# Patient Record
Sex: MALE | Race: BLACK OR AFRICAN AMERICAN | Employment: FULL TIME | URBAN - METROPOLITAN AREA
[De-identification: names, ages, dates, MRNs, and addresses within clinical notes are randomized per-mention and may not be internally consistent; named-entity substitution may affect disease eponyms.]

---

## 2018-06-27 ENCOUNTER — HOSPITAL ENCOUNTER (OUTPATIENT)
Dept: PHYSICAL THERAPY | Age: 68
Discharge: HOME | End: 2018-06-27
Payer: MEDICARE

## 2018-06-27 PROCEDURE — G8979 MOBILITY GOAL STATUS: HCPCS

## 2018-06-27 PROCEDURE — 97110 THERAPEUTIC EXERCISES: CPT

## 2018-06-27 PROCEDURE — G8978 MOBILITY CURRENT STATUS: HCPCS

## 2018-06-27 PROCEDURE — 97162 PT EVAL MOD COMPLEX 30 MIN: CPT

## 2018-06-27 NOTE — PROGRESS NOTES
[facility] at [address] [place] [address]  Phone: [phone]   Fax: [phone]    Plan of Care/ Statement of Necessity for Physical Therapy Services    Patient name: Xavier Hess Start of Care: 2018   Referral source: Leif Urias MD : 1950    Medical Diagnosis: Low back pain [M54.5]   Onset Date:chronic    Treatment Diagnosis: back pain   Prior Hospitalization: see medical history Provider#: [id number]   Medications: Verified on Patient summary List    Comorbidities: DM, HTN   Prior Level of Function: chronic back pain with ADL's      The Plan of Care and following information is based on the information from the initial evaluation. Assessment/ key information: Pt is a 81 yo male presenting to clinic with c/o chronic back pain that has progressively worsened over the years. Pain is constant in nature and wakes pt up at night. On exam, lumbar AROM limited all planes by at least 50% except left rotation. Pt has decreased hip mobility B, significant HS tightness left > right, and decreased activity/positoin tolerance. Innominates currently aligned and stable. Pt would benefit from skilled PT intervention to address the findings.     Evaluation Complexity History MEDIUM  Complexity : 1-2 comorbidities / personal factors will impact the outcome/ POC ; Examination MEDIUM Complexity : 3 Standardized tests and measures addressing body structure, function, activity limitation and / or participation in recreation  ;Presentation MEDIUM Complexity : Evolving with changing characteristics  ; Clinical Decision Making MEDIUM Complexity : FOTO score of 26-74  Overall Complexity Rating: MEDIUM  Problem List: pain affecting function, decrease ROM, decrease strength, decrease ADL/ functional abilitiies, decrease activity tolerance and decrease flexibility/ joint mobility   Treatment Plan may include any combination of the following: Therapeutic exercise, Therapeutic activities, Neuromuscular re-education, Physical agent/modality, Manual therapy, Aquatic therapy and Patient education  Patient / Family readiness to learn indicated by: asking questions, trying to perform skills and interest  Persons(s) to be included in education: patient (P)  Barriers to Learning/Limitations: None  Patient Goal (s): pain relief  Patient Self Reported Health Status: good  Rehabilitation Potential: good    Short Term Goals: To be accomplished in 2 weeks:  1. Patient will tolerate a full aquatics session without increased pain or difficulty. Status at eval: N/A  2. Pt will report >/= 25% improvement in symptoms to increase activity/position tolerance. Status at eval: 0%    Long Term Goals: To be accomplished in 4 weeks:  1. Improve FOTO score to >/= 56/100 to indicate decreased pain with ADL's. Status at eval: 44/100  2. Pt will report >/= 50% improvement in symptoms to increase activity/position tolerance. Status at eval: 0%  3. Pt will be independent with aquatics program to transition to pool on own at time of discharge. Status at eval: not independent  4. Increase B HS flexibility by 10 degrees to normalize function. Status at eval: 20 degrees right, 30 degrees left    Frequency / Duration: Patient to be seen 2 times per week for 4 weeks. Patient/ Caregiver education and instruction: Diagnosis, prognosis, exercises   [x]  Plan of care has been reviewed with TERRELL    G-Codes (GP)  Mobility   Current  CK= 40-59%   Goal  CK= 40-59%    The severity rating is based on clinical judgment and the FOTO score. Certification Period: 6/27/18 - 8/24/18    Estela Patterson, PT 6/27/2018 7:50 AM  _____________________________________________________________________  I certify that the above Therapy Services are being furnished while the patient is under my care. I agree with the treatment plan and certify that this therapy is necessary.     Physician's Signature:____________________ Date:__________Time:______    Please sign and return to   [facility] at [address] [place] [address]  Phone: [phone]   Fax: [phone]

## 2018-06-27 NOTE — PROGRESS NOTES
PT DAILY TREATMENT NOTE - [facility]     Patient Name: Carol Soriano  Date:2018  : 1950  [x]  Patient  Verified  Payor:  / Plan: [facility]  RETIREES AND DEPENDENTS / Product Type: Johnita Stack /    In time:800  Out time:830  Total Treatment Time (min): 30  Total Timed Codes (min): 8  1:1 Treatment Time ( only): 30   Visit #: 1 of 8    Treatment Area: Low back pain [M54.5]    SUBJECTIVE  Pain Level (0-10 scale): 6-7/10 seated at rest  Any medication changes, allergies to medications, adverse drug reactions, diagnosis change, or new procedure performed?: [x] No    [] Yes (see summary sheet for update)  Subjective functional status/changes:   [] No changes reported  See POC    OBJECTIVE        Min Type Additional Details    [] Estim:  []Unatt       []IFC  []Premod                        []Other:  []w/ice   []w/heat  Position:  Location:    [] Estim: []Att    []TENS instruct  []NMES                    []Other:  []w/US   []w/ice   []w/heat  Position:  Location:    []  Traction: [] Cervical       []Lumbar                       [] Prone          []Supine                       []Intermittent   []Continuous Lbs:  [] before manual  [] after manual    []  Ultrasound: []Continuous   [] Pulsed                           []1MHz   []3MHz W/cm2:  Location:    []  Iontophoresis with dexamethasone         Location: [] Take home patch   [] In clinic    []  Ice     []  heat  []  Ice massage  []  Laser   []  Anodyne Position:  Location:    []  Laser with stim  []  Other:  Position:  Location:    []  Vasopneumatic Device Pressure:       [] lo [] med [] hi   Temperature: [] lo [] med [] hi   [] Skin assessment post-treatment:  []intact []redness- no adverse reaction    []redness  adverse reaction:     22 min [x]Eval                  []Re-Eval       8 min Therapeutic Exercise:  [] See flow sheet : issued and reviewed initial HEP   Rationale: increase ROM to improve the patients ability to normalize ADL's     min Therapeutic Activity:  []  See flow sheet :         min Neuromuscular Re-education:  []  See flow sheet :        min Manual Therapy:          min Gait Training:  ___ feet with ___ device on level surfaces with ___ level of assist   Rationale: With   [] TE   [] TA   [] neuro   [] other: Patient Education: [x] Review HEP    [] Progressed/Changed HEP based on:   [] positioning   [] body mechanics   [] transfers   [] heat/ice application    [] other:      Other Objective/Functional Measures:   Physical Therapy Evaluation - [facility] ([facility])    SUBJECTIVE  Chief Complaint: Pt c/o chronic back pain that has progressively worsened over the years. Pain is constant in nature and wakes pt up at night. Mechanism of injury:    Symptoms:  Pain rating (0-10): Today:    Best:    Worst:    [x] Contstant:    [] Intermittent:     Aggravated by:   [] Bending [] Sitting [] Standing [] Walking   [] Moving [] Cough [] Sneeze [] Valsalva   [] AM  [] PM  Lying:  [] sup   [] pro   [] sidelying   [] Other:     Eased by:    [] Bending [] Sitting [] Standing [] Walking   [] Moving [] AM  [] PM  Lying: [] sup  [] pro  [] sidelying   [] Other:     General Health:  Red Flags Indicated? [] Yes    [] No  [] Yes [] No Recent weight change (If yes, due to dieting?  [] Yes  [] No)   [] Yes [] No Weakness in legs during walking  [] Yes [] No Unremitting pain at night  [] Yes [] No Abdominal pain or problems  [] Yes [] No Rectal bleeding  [] Yes [] No Feet more cold or painful in cold weather  [] Yes [] No Menstrual irregularities  [] Yes [] No Blood or pain with urination  [] Yes [] No Dysfunction of bowel or bladder  [] Yes [] No Recent illness within past 3 weeks (i.e, cold, flu)  [] Yes [] No Numbness/tingling in buttock/genitalia region    Past History/Treatments:     Diagnostic Tests: [] Lab work [x] X-rays    [] CT [x] MRI     [] Other:  Results: DDD    Functional Status  Prior level of function: chronic back pain with ADL's  Present functional limitations: prolonged standing and walking  What position do you sleep in?: toss and turn    OBJECTIVE  Posture:  Lateral Shift: [] R    [] L     [] +  [] -  Kyphosis: [] Increased [] Decreased   []  WNL  Lordosis:  [] Increased [] Decreased   [] WNL  Pelvic symmetry: [] WNL    [] Other:    Gait:  [] Normal     [] Abnormal:    Active Movements: [] N/A   [] Too acute   [] Other:  ROM % AROM % PROM Comments:pain, area   Forward flexion 40-60 Limited 50%     Extension 20-30 Limited 75%     SB right 20-30 Limited 50%     SB left 20-30 Limited 50%     Rotation right 5-10 Limited 50%     Rotation left 5-10 Limited 25%       Repeated Movements   Effects on present pain: produces (TN), abolishes (A), increases (incr), decreases (decr), centralizes (C), peripheral (PH), no effect (NE)   Pre-Test Sx Flexion Repeated Flexion Extension Repeated Extension Repeated SBL Repeated SBR   Sitting          Standing          Lying      N/A N/A   Comments:  Side Glide:  Sustained passive positioning test:    Neuro Screen [] WNL  Myotome/Dermatome/Reflexes:  Comments:    Dural Mobility:  SLR Sitting: [] R    [] L    [] +    [] -  @ (degrees):           Supine: [] R    [] L    [] +    [] -  @ (degrees):   Slump Test: [] R    [] L    [] +    [] -  @ (degrees):   Prone Knee Bend: [] R    [] L    [] +    [] -     Palpation  [] Min  [] Mod  [] Severe    Location:  [] Min  [] Mod  [] Severe    Location:  [] Min  [] Mod  [] Severe    Location:    Stabilization Tests  Multifidus Test  Level 1: Prone abdominal draw in (Goal 6-10mmHG):  Level 2: Supported leg load supine (needle deflection at 40mmHG): [] Yes  [] No   Level 3: Unsupported leg load supine (needle deflection at 40mmHG): [] Yes  [] No     Strength   L(0-5) R (0-5) N/T   Hip Flexion (L1,2)   []   Knee Extension (L3,4)   []   Ankle Dorsiflexion (L4)   []   Great Toe Extension (L5)   []   Ankle Plantarflexion (S1)   []   Knee Flexion (S1,2)   []   Upper Abdominals   []   Lower Abdominals   []   Paraspinals   []   Back Rotators   []   Gluteus Seth   []   Other   []     Special Tests  Lumbar:  Lumb. Compression: [] Pos  [] Neg               Lumbar Distraction:   [] Pos  [] Neg    Quadrant:  [] Pos  [] Neg   [] Flex  [] Ext    Sacroilliac:  Gaenslen's: [] R    [] L    [] +    [] -     Compression: [] +    [] -     Gapping:  [] +    [] -     Thigh Thrust: [] R    [] L    [] +    [] -     Leg Length: [] +    [] -   Position:    Crests:    ASIS:    PSIS:    Sacral Sulcus:    Mobility: Standing flex:     Sitting flex:     Supine to sit:     Prone knee bend:         Hip: Sudie Macadam:  [] R    [] L    [] +    [] -     Scour:  [] R    [] L    [] +    [] -     Piriformis: [] R    [] L    [] +    [] -          Deficits: Mari's: [] R    [] L    [] +    [] -     Emir: [] R    [] L    [] +    [] -     Hamstrings 90/90: right: 20 degrees, left: 30    Gastrocsoleus (to neutral): Right: Left:       Global Muscular Weakness:  Abdominals:  Quadratus Lumborum:  Paraspinals: Other:    Other tests/comments:  Long sit test: ( - )  Limited hip mobility B       Pain Level (0-10 scale) post treatment: 6-7/10    ASSESSMENT/Changes in Function: see POC    Patient will continue to benefit from skilled PT services to modify and progress therapeutic interventions, address ROM deficits, address strength deficits, analyze and address soft tissue restrictions, analyze and cue movement patterns, analyze and modify body mechanics/ergonomics and assess and modify postural abnormalities to attain remaining goals.      [x]  See Plan of Care  []  See progress note/recertification  []  See Discharge Summary         Progress towards goals / Updated goals:  See POC    PLAN  [x]  Upgrade activities as tolerated     [x]  Continue plan of care  []  Update interventions per flow sheet       []  Discharge due to:_  [x]  Other 1x/wk on land, 1x/wk on aquatics: ROM/stretching, core stability, manual techniques, mod akilah Gamble, MARIE 6/27/2018  7:49 AM    Future Appointments  Date Time Provider Khoa Che   6/27/2018 8:00 AM Kayden Gamble, PT [email or web address]

## 2018-06-29 ENCOUNTER — APPOINTMENT (OUTPATIENT)
Dept: PHYSICAL THERAPY | Age: 68
End: 2018-06-29
Payer: MEDICARE

## 2018-07-03 ENCOUNTER — HOSPITAL ENCOUNTER (OUTPATIENT)
Dept: PHYSICAL THERAPY | Age: 68
Discharge: HOME | End: 2018-07-03
Payer: MEDICARE

## 2018-07-03 PROCEDURE — 97110 THERAPEUTIC EXERCISES: CPT

## 2018-07-03 NOTE — PROGRESS NOTES
PT DAILY TREATMENT NOTE - [facility]     Patient Name: Bert Colmenares  Date:7/3/2018  : 1950  [x]  Patient  Verified  [place]or[place] [employer] / Plan: [place] / Product Type: Medicare /    In time:102  Out time:143  Total Treatment Time (min): 41  Total Timed Codes (min): 31  1:1 Treatment Time ( only): 23   Visit #: 2 of 8    Treatment Area: Low back pain [M54.5]    SUBJECTIVE  Pain Level (0-10 scale): 6-7/10  Any medication changes, allergies to medications, adverse drug reactions, diagnosis change, or new procedure performed?: [x] No    [] Yes (see summary sheet for update)  Subjective functional status/changes:   [x] No changes reported      OBJECTIVE    Modality rationale: decrease pain and increase tissue extensibility to improve the patients ability to increase activity/position tolerance   Min Type Additional Details    [] Estim:  []Unatt       []IFC  []Premod                        []Other:  []w/ice   []w/heat  Position:  Location:    [] Estim: []Att    []TENS instruct  []NMES                    []Other:  []w/US   []w/ice   []w/heat  Position:  Location:    []  Traction: [] Cervical       []Lumbar                       [] Prone          []Supine                       []Intermittent   []Continuous Lbs:  [] before manual  [] after manual    []  Ultrasound: []Continuous   [] Pulsed                           []1MHz   []3MHz W/cm2:  Location:    []  Iontophoresis with dexamethasone         Location: [] Take home patch   [] In clinic   10 []  Ice     [x]  heat  []  Ice massage  []  Laser   []  Anodyne Position:H/L supine  Location:back    []  Laser with stim  []  Other:  Position:  Location:    []  Vasopneumatic Device Pressure:       [] lo [] med [] hi   Temperature: [] lo [] med [] hi   [] Skin assessment post-treatment:  []intact []redness- no adverse reaction    []redness  adverse reaction:      min []Eval                  []Re-Eval       31 min Therapeutic Exercise:  [x] See flow sheet :   Rationale: increase ROM and increase strength to improve the patients ability to normalize ADL's     min Therapeutic Activity:  []  See flow sheet :         min Neuromuscular Re-education:  []  See flow sheet :        min Manual Therapy:          min Gait Training:  ___ feet with ___ device on level surfaces with ___ level of assist   Rationale: With   [] TE   [] TA   [] neuro   [] other: Patient Education: [x] Review HEP    [] Progressed/Changed HEP based on:   [] positioning   [] body mechanics   [] transfers   [] heat/ice application    [] other:      Other Objective/Functional Measures: none taken today     Pain Level (0-10 scale) post treatment: 6-7/10    ASSESSMENT/Changes in Function: 1st session since eval: no new progress. Patient will continue to benefit from skilled PT services to modify and progress therapeutic interventions, address ROM deficits, address strength deficits, analyze and address soft tissue restrictions, analyze and cue movement patterns, analyze and modify body mechanics/ergonomics and assess and modify postural abnormalities to attain remaining goals. []  See Plan of Care  []  See progress note/recertification  []  See Discharge Summary         Progress towards goals / Updated goals:  Short Term Goals: To be accomplished in 2 weeks:  1. Patient will tolerate a full aquatics session without increased pain or difficulty. Status at eval: N/A  2. Pt will report >/= 25% improvement in symptoms to increase activity/position tolerance. Status at eval: 0%     Long Term Goals: To be accomplished in 4 weeks:  1. Improve FOTO score to >/= 56/100 to indicate decreased pain with ADL's. Status at eval: 44/100  2. Pt will report >/= 50% improvement in symptoms to increase activity/position tolerance. Status at eval: 0%  3. Pt will be independent with aquatics program to transition to pool on own at time of discharge. Status at eval: not independent  4.  Increase B HS flexibility by 10 degrees to normalize function.   Status at eval: 20 degrees right, 30 degrees left    PLAN  [x]  Upgrade activities as tolerated     [x]  Continue plan of care  []  Update interventions per flow sheet       []  Discharge due to:_  []  Other:_      Brittany Bernabe PT 7/3/2018  12:53 PM    Future Appointments  Date Time Provider Khoa Che   7/3/2018 1:00 PM Alec Ortiz [facility]   7/6/2018 11:30 AM MARIE Ortiz [facility]   7/10/2018 1:00 PM MARIE Ortiz [facility]   7/13/2018 12:30 PM MARIE Ortiz [facility]   7/24/2018 1:00 PM MARIE Ortiz [facility]

## 2018-07-06 ENCOUNTER — HOSPITAL ENCOUNTER (OUTPATIENT)
Dept: PHYSICAL THERAPY | Age: 68
Discharge: HOME | End: 2018-07-06
Payer: MEDICARE

## 2018-07-06 PROCEDURE — 97113 AQUATIC THERAPY/EXERCISES: CPT

## 2018-07-10 ENCOUNTER — HOSPITAL ENCOUNTER (OUTPATIENT)
Dept: PHYSICAL THERAPY | Age: 68
Discharge: HOME | End: 2018-07-10
Payer: MEDICARE

## 2018-07-10 PROCEDURE — 97110 THERAPEUTIC EXERCISES: CPT

## 2018-07-10 NOTE — PROGRESS NOTES
PT DAILY TREATMENT NOTE - [facility]     Patient Name: Donny Magallanes  Date:7/10/2018  : 1950  [x]  Patient  Verified  Payor: [place] [employer] / Plan: [place] MEDICARE PART A & B / Product Type: Medicare /    In time:1247  Out time:135  Total Treatment Time (min): 48  Total Timed Codes (min): 38  1:1 Treatment Time ( [address] only): 45   Visit #: 4 of 8    Treatment Area: Low back pain [M54.5]    SUBJECTIVE  Pain Level (0-10 scale): 6-7/10  Any medication changes, allergies to medications, adverse drug reactions, diagnosis change, or new procedure performed?: [x] No    [] Yes (see summary sheet for update)  Subjective functional status/changes:   [] No changes reported  It's the same.     OBJECTIVE    Modality rationale: decrease pain and increase tissue extensibility to improve the patients ability to increase activity/position tolerance   Min Type Additional Details    [] Estim:  []Unatt       []IFC  []Premod                        []Other:  []w/ice   []w/heat  Position:  Location:    [] Estim: []Att    []TENS instruct  []NMES                    []Other:  []w/US   []w/ice   []w/heat  Position:  Location:    []  Traction: [] Cervical       []Lumbar                       [] Prone          []Supine                       []Intermittent   []Continuous Lbs:  [] before manual  [] after manual    []  Ultrasound: []Continuous   [] Pulsed                           []1MHz   []3MHz W/cm2:  Location:    []  Iontophoresis with dexamethasone         Location: [] Take home patch   [] In clinic   10 []  Ice     [x]  heat  []  Ice massage  []  Laser   []  Anodyne Position: H/L supine  Location:back    []  Laser with stim  []  Other:  Position:  Location:    []  Vasopneumatic Device Pressure:       [] lo [] med [] hi   Temperature: [] lo [] med [] hi   [] Skin assessment post-treatment:  []intact []redness- no adverse reaction    []redness  adverse reaction:      min []Eval                  []Re-Eval       38 min Therapeutic Exercise: [x] See flow sheet :   Rationale: increase ROM and increase strength to improve the patients ability to normalize ADL's     min Therapeutic Activity:  []  See flow sheet :         min Neuromuscular Re-education:  []  See flow sheet :        min Manual Therapy:          min Gait Training:  ___ feet with ___ device on level surfaces with ___ level of assist   Rationale: With   [] TE   [] TA   [] neuro   [] other: Patient Education: [x] Review HEP    [] Progressed/Changed HEP based on:   [] positioning   [] body mechanics   [] transfers   [] heat/ice application    [] other:      Other Objective/Functional Measures: see goal review     Pain Level (0-10 scale) post treatment: 6-7/10    ASSESSMENT/Changes in Function: Symptoms persist and to date unchanged with PT. Patient will continue to benefit from skilled PT services to modify and progress therapeutic interventions, address ROM deficits, address strength deficits, analyze and address soft tissue restrictions, analyze and cue movement patterns, analyze and modify body mechanics/ergonomics and assess and modify postural abnormalities to attain remaining goals. []  See Plan of Care  []  See progress note/recertification  []  See Discharge Summary         Progress towards goals / Updated goals:  Short Term Goals: To be accomplished in 2 weeks:  1. Patient will tolerate a full aquatics session without increased pain or difficulty. Status at eval: N/A  Current Status: currently met  2. Pt will report >/= 25% improvement in symptoms to increase activity/position tolerance. Status at eval: 0%   Current Status: no change      Long Term Goals: To be accomplished in 4 weeks:  1. Improve FOTO score to >/= 56/100 to indicate decreased pain with ADL's. Status at eval: 44/100  Current Status: retest next visit (visit 5)  2. Pt will report >/= 50% improvement in symptoms to increase activity/position tolerance. Status at eval: 0%  Current Status: no change  3. Pt will be independent with aquatics program to transition to pool on own at time of discharge. Status at eval: not independent  Current Status: not independent  4. Increase B HS flexibility by 10 degrees to normalize function.   Status at eval: 20 degrees right, 30 degrees left  Current Status: progressing with stretching    PLAN  [x]  Upgrade activities as tolerated     [x]  Continue plan of care  []  Update interventions per flow sheet       []  Discharge due to:_  [x]  Other: Arleth He next visit     Luly Haque PT 7/10/2018  9:58 AM    Future Appointments  Date Time Provider Koha Che   7/10/2018 1:00 PM MARIE Barnes [facility]   7/13/2018 12:30 PM MARIE Barnes [facility]   7/24/2018 1:00 PM MARIE Barnes [facility]

## 2018-07-13 ENCOUNTER — HOSPITAL ENCOUNTER (OUTPATIENT)
Dept: PHYSICAL THERAPY | Age: 68
Discharge: HOME | End: 2018-07-13
Payer: MEDICARE

## 2018-07-13 PROCEDURE — 97113 AQUATIC THERAPY/EXERCISES: CPT

## 2018-07-17 ENCOUNTER — APPOINTMENT (OUTPATIENT)
Dept: PHYSICAL THERAPY | Age: 68
End: 2018-07-17
Payer: MEDICARE

## 2018-07-24 ENCOUNTER — HOSPITAL ENCOUNTER (OUTPATIENT)
Dept: PHYSICAL THERAPY | Age: 68
Discharge: HOME | End: 2018-07-24
Payer: MEDICARE

## 2018-07-24 PROCEDURE — 97110 THERAPEUTIC EXERCISES: CPT

## 2018-07-24 NOTE — PROGRESS NOTES
PT DAILY TREATMENT NOTE - [facility]     Patient Name: Bert Colmenares  Date:2018  : 1950  [x]  Patient  Verified  Payor: Linder Cheadle / Plan: [place] / Product Type: Medicare /    In time:100  Out time:145  Total Treatment Time (min): 45  Total Timed Codes (min): 35  1:1 Treatment Time ( [address] only): 35   Visit #: 6 of 8    Treatment Area: Low back pain [M54.5]    SUBJECTIVE  Pain Level (0-10 scale): 6-7/10  Any medication changes, allergies to medications, adverse drug reactions, diagnosis change, or new procedure performed?: [x] No    [] Yes (see summary sheet for update)  Subjective functional status/changes:   [] No changes reported  It's the same.     OBJECTIVE    Modality rationale: decrease pain and increase tissue extensibility to improve the patients ability to increase activity/position tolerance   Min Type Additional Details    [] Estim:  []Unatt       []IFC  []Premod                        []Other:  []w/ice   []w/heat  Position:  Location:    [] Estim: []Att    []TENS instruct  []NMES                    []Other:  []w/US   []w/ice   []w/heat  Position:  Location:    []  Traction: [] Cervical       []Lumbar                       [] Prone          []Supine                       []Intermittent   []Continuous Lbs:  [] before manual  [] after manual    []  Ultrasound: []Continuous   [] Pulsed                           []1MHz   []3MHz W/cm2:  Location:    []  Iontophoresis with dexamethasone         Location: [] Take home patch   [] In clinic   10 []  Ice     [x]  heat  []  Ice massage  []  Laser   []  Anodyne Position: H/L supine  Location:back    []  Laser with stim  []  Other:  Position:  Location:    []  Vasopneumatic Device Pressure:       [] lo [] med [] hi   Temperature: [] lo [] med [] hi   [] Skin assessment post-treatment:  []intact []redness- no adverse reaction    []redness  adverse reaction:      min []Eval                  []Re-Eval       35 min Therapeutic Exercise: [x] See flow sheet :   Rationale: increase ROM and increase strength to improve the patients ability to normalize ADL's     min Therapeutic Activity:  []  See flow sheet :         min Neuromuscular Re-education:  []  See flow sheet :        min Manual Therapy:          min Gait Training:  ___ feet with ___ device on level surfaces with ___ level of assist   Rationale: With   [] TE   [] TA   [] neuro   [] other: Patient Education: [x] Review HEP    [] Progressed/Changed HEP based on:   [] positioning   [] body mechanics   [] transfers   [] heat/ice application    [] other:      Other Objective/Functional Measures: see goal review for FOTO     Pain Level (0-10 scale) post treatment: 6-7/10    ASSESSMENT/Changes in Function: No improvement in FOTO score from [facility] and no change in pain from [facility]. Patient will continue to benefit from skilled PT services to modify and progress therapeutic interventions, address ROM deficits, address strength deficits, analyze and address soft tissue restrictions, analyze and cue movement patterns, analyze and modify body mechanics/ergonomics and assess and modify postural abnormalities to attain remaining goals. []  See Plan of Care  []  See progress note/recertification  []  See Discharge Summary         Progress towards goals / Updated goals:  Short Term Goals: To be accomplished in 2 weeks:  1. Patient will tolerate a full aquatics session without increased pain or difficulty. Status at eval: N/A  Current Status: currently met  2. Pt will report >/= 25% improvement in symptoms to increase activity/position tolerance. Status at eval: 0%   Current Status: no change      Long Term Goals: To be accomplished in 4 weeks:  1. Improve FOTO score to >/= 56/100 to indicate decreased pain with ADL's. Status at eval: 44/100  Current Status: not met: 44/100   2. Pt will report >/= 50% improvement in symptoms to increase activity/position tolerance.   Status at eval: 0%  Current Status: no change  3. Pt will be independent with aquatics program to transition to pool on own at time of discharge. Status at eval: not independent  Current Status: progressing with independence  4. Increase B HS flexibility by 10 degrees to normalize function.   Status at eval: 20 degrees right, 30 degrees left  Current Status: progressing with stretching    PLAN  []  Upgrade activities as tolerated     []  Continue plan of care  []  Update interventions per flow sheet       []  Discharge due to:_  [x]  Other: D/C next visit to [facility] (issued trial Y pass) secondary to lack of progress    Gumaro Park PT 7/24/2018  10:29 AM    Future Appointments  Date Time Provider Khoa Che   7/24/2018 1:00 PM Carlos Alford [facility]   7/27/2018 12:30 PM MARIE Alford [facility]

## 2018-07-27 ENCOUNTER — HOSPITAL ENCOUNTER (OUTPATIENT)
Dept: PHYSICAL THERAPY | Age: 68
Discharge: HOME | End: 2018-07-27
Payer: MEDICARE

## 2018-07-27 PROCEDURE — G8980 MOBILITY D/C STATUS: HCPCS

## 2018-07-27 PROCEDURE — G8979 MOBILITY GOAL STATUS: HCPCS

## 2018-07-27 PROCEDURE — 97113 AQUATIC THERAPY/EXERCISES: CPT

## 2018-07-27 NOTE — PROGRESS NOTES
[facility] at [address] [place] [address], [address] [place] [address] Phone: [phone]   Fax: [phone] Discharge Summary Patient name: Ricco Asher     Start of Care: 18 Referral source: Mike Gandhi MD    : 1950 Medical/Treatment Diagnosis: Low back pain [M54.5]  Onset Date:chronic Prior Hospitalization: see medical history   Provider#: [id number] Comorbidities: DM, HTN Prior Level of Function:chronic back pain with ADL's 
Medications: Verified on Patient Summary List 
 
Visits from Start of Care: 7    Missed Visits: 1 Reporting Period : 18 to 18 Short Term Goals: To be accomplished in 2 weeks: 1. Patient will tolerate a full aquatics session without increased pain or difficulty. Status at eval: N/A Current Status: currently met 2. Pt will report >/= 25% improvement in symptoms to increase activity/position tolerance. Status at eval: 0% Current Status: no change 
   
Long Term Goals: To be accomplished in 4 weeks: 1. Improve FOTO score to >/= 56/100 to indicate decreased pain with ADL's. Status at eval: 44/100 Current Status: not met: 44/100 2. Pt will report >/= 50% improvement in symptoms to increase activity/position tolerance. Status at eval: 0% Current Status: no change 3. Pt will be independent with aquatics program to transition to pool on own at time of discharge. Status at eval: not independent Current Status: progressing with independence 4. Increase B HS flexibility by 10 degrees to normalize function. Status at eval: 20 degrees right, 30 degrees left Current Status: progressing with stretching 
  
 
G-Codes (GP) Mobility  Goal  CK= 40-59%  D/C  CK= 40-59% The severity rating is based on clinical judgment and the FOTO score. Assessment/ Summary of Care: No improvement in FOTO score from [facility] and no change in pain from [facility]. Symptoms persist and to date unchanged with PT.  Will D/C PT at this time. Pt to continue on own with self-monitored exercise at this time. RECOMMENDATIONS: 
[x]Discontinue therapy: []Patient has reached or is progressing toward set goals []Patient is non-compliant or has abdicated 
    [x]Due to lack of appreciable progress towards set goals Gumaro Park, PT 7/27/2018 11:16 AM